# Patient Record
Sex: FEMALE | Race: WHITE
[De-identification: names, ages, dates, MRNs, and addresses within clinical notes are randomized per-mention and may not be internally consistent; named-entity substitution may affect disease eponyms.]

---

## 2017-10-26 ENCOUNTER — HOSPITAL ENCOUNTER (OUTPATIENT)
Dept: HOSPITAL 25 - OR | Age: 37
Discharge: HOME | End: 2017-10-26
Attending: OBSTETRICS & GYNECOLOGY
Payer: COMMERCIAL

## 2017-10-26 VITALS — SYSTOLIC BLOOD PRESSURE: 122 MMHG | DIASTOLIC BLOOD PRESSURE: 93 MMHG

## 2017-10-26 DIAGNOSIS — O02.1: Primary | ICD-10-CM

## 2017-10-26 DIAGNOSIS — G43.909: ICD-10-CM

## 2017-10-26 DIAGNOSIS — F41.9: ICD-10-CM

## 2017-10-26 LAB
HCT VFR BLD AUTO: 42 % (ref 35–47)
HGB BLD-MCNC: 14.3 G/DL (ref 12–16)
MCH RBC QN AUTO: 32 PG (ref 27–31)
MCHC RBC AUTO-ENTMCNC: 35 G/DL (ref 31–36)
MCV RBC AUTO: 93 FL (ref 80–97)
RBC # BLD AUTO: 4.49 10^6/UL (ref 4–5.4)
WBC # BLD AUTO: 15.6 10^3/UL (ref 3.5–10.8)

## 2017-10-26 PROCEDURE — 86900 BLOOD TYPING SEROLOGIC ABO: CPT

## 2017-10-26 PROCEDURE — 36415 COLL VENOUS BLD VENIPUNCTURE: CPT

## 2017-10-26 PROCEDURE — 86901 BLOOD TYPING SEROLOGIC RH(D): CPT

## 2017-10-26 PROCEDURE — 88305 TISSUE EXAM BY PATHOLOGIST: CPT

## 2017-10-26 PROCEDURE — 85025 COMPLETE CBC W/AUTO DIFF WBC: CPT

## 2017-10-26 PROCEDURE — 86850 RBC ANTIBODY SCREEN: CPT

## 2017-10-27 NOTE — OP
OPERATIVE REPORT:

 

DATE OF OPERATION:  10/26/17 - [facility]

 

DATE OF BIRTH:  [birth year]

 

SURGEON:  Aleksandar Thayer MD



ANESTHESIOLOGIST: Dannielle Pinto MD



ANESTHESIA: General.

 

PRE-OP DIAGNOSIS:  Missed .

 

POST-OP DIAGNOSIS:  Missed .

 

OPERATIVE PROCEDURE:  D and C.

 

COMPLICATIONS:  None.

 

FINDINGS:  On exam under anesthesia, uterus is 12-week size.  Cervix, vagina, 
and vulva appeared normal.

 

DESCRIPTION OF PROCEDURE:  Patient identified, procedure identified as D and C. 
The patient was taken to the operating room, prepped and draped in the usual 
fashion in dorsal lithotomy position under general anesthesia.  Two single-
tooth tenaculums were placed on the anterior lip of the cervix.  The cervix was 
easily dilated up to a #33 Le dilator.  The #12 suction curette was inserted 
and suction curettage performed.  The sharp curette was inserted and copious 
tissue and fluid have been taken with the vacuum.  The sharp curette was 
inserted and sharp curettage performed until the gritty sensation was felt 
throughout the circumference.  Moderate amounts of tissue obtained.  The polyp 
forceps were inserted and no further tissue was obtained using this.  The 
suction curette was inserted one more time and no further tissue was obtained.  
At the end of the procedure, a gritty sensation felt throughout the 
circumference.  All instruments were removed from the vagina.  The patient 
returned to recovery room in stable condition.  All sponge and instrument 
counts were correct.

 

 [id number]/CPS #: [id number]

MTDD

## 2018-11-21 ENCOUNTER — HOSPITAL ENCOUNTER (INPATIENT)
Dept: HOSPITAL 25 - MCHOBOUT | Age: 38
LOS: 1 days | Discharge: HOME | DRG: 560 | End: 2018-11-22
Attending: MIDWIFE | Admitting: MIDWIFE
Payer: COMMERCIAL

## 2018-11-21 DIAGNOSIS — Z3A.40: ICD-10-CM

## 2018-11-21 DIAGNOSIS — O22.43: ICD-10-CM

## 2018-11-21 DIAGNOSIS — O48.0: Primary | ICD-10-CM

## 2018-11-21 DIAGNOSIS — O43.893: ICD-10-CM

## 2018-11-21 LAB
HCT VFR BLD AUTO: 37 % (ref 35–47)
HGB BLD-MCNC: 12.6 G/DL (ref 12–16)
MCH RBC QN AUTO: 31 PG (ref 27–31)
MCHC RBC AUTO-ENTMCNC: 34 G/DL (ref 31–36)
MCV RBC AUTO: 91 FL (ref 80–97)
PLATELET # BLD AUTO: 277 10^3/UL (ref 150–450)
RBC # BLD AUTO: 4.04 10^6/UL (ref 4–5.4)
WBC # BLD AUTO: 11.7 10^3/UL (ref 3.5–10.8)

## 2018-11-21 PROCEDURE — 0HQ9XZZ REPAIR PERINEUM SKIN, EXTERNAL APPROACH: ICD-10-PCS | Performed by: MIDWIFE

## 2018-11-21 PROCEDURE — 86901 BLOOD TYPING SEROLOGIC RH(D): CPT

## 2018-11-21 PROCEDURE — 85027 COMPLETE CBC AUTOMATED: CPT

## 2018-11-21 PROCEDURE — 86850 RBC ANTIBODY SCREEN: CPT

## 2018-11-21 PROCEDURE — 10907ZC DRAINAGE OF AMNIOTIC FLUID, THERAPEUTIC FROM PRODUCTS OF CONCEPTION, VIA NATURAL OR ARTIFICIAL OPENING: ICD-10-PCS | Performed by: MIDWIFE

## 2018-11-21 PROCEDURE — 4A1HXCZ MONITORING OF PRODUCTS OF CONCEPTION, CARDIAC RATE, EXTERNAL APPROACH: ICD-10-PCS | Performed by: MIDWIFE

## 2018-11-21 PROCEDURE — 86900 BLOOD TYPING SEROLOGIC ABO: CPT

## 2018-11-21 PROCEDURE — 85025 COMPLETE CBC W/AUTO DIFF WBC: CPT

## 2018-11-21 PROCEDURE — 36415 COLL VENOUS BLD VENIPUNCTURE: CPT

## 2018-11-21 RX ADMIN — IBUPROFEN PRN MG: 600 TABLET, FILM COATED ORAL at 12:47

## 2018-11-21 RX ADMIN — DOCUSATE SODIUM SCH MG: 100 CAPSULE, LIQUID FILLED ORAL at 20:40

## 2018-11-21 RX ADMIN — DOCUSATE SODIUM SCH MG: 100 CAPSULE, LIQUID FILLED ORAL at 12:48

## 2018-11-21 RX ADMIN — DOCUSATE SODIUM SCH: 100 CAPSULE, LIQUID FILLED ORAL at 11:36

## 2018-11-21 NOTE — HP
General Information





- Reason for Visit





Contractions started earlier yesterday and were irregular. Was able to sleep 

but awoke about 0130 with more intense contractions. Now approx Q 4-6 min.





- General Information


Maternal Age: 38


Grav: 3


Para: 1


SAB: 1


IEA: 0





Estimated Due Date: 18


Determined By: LMP


Maternal Blood Type and Rh: O Positive





- Results this Pregnancy


Serology/RPR Result: Non-Reactive


Rubella Result: Immune


HBsAg Result: Negative


HIV Result: Negative


GBS Culture Result: Negative





Past Medical History


Delivery History: Hx Complicated Vaginal Delivery


Delivery History Comment: 





SVB  with PPH 700ml


Pertinent Past Medical History: See  Records


Past Medical History Comment: 





Anxiety


Migraine


Seasonal allergies


HTN


Pertinent Past Surgical History: See  Records


Past Surgical History Comment: 





[place] teeth 1998


Pertinent Family History: Non-Contributory - D


Family History Comment: 





Diabetes


Obesity


MI


Ovarian Ca


Brain aneurysm


Anxiety


Prostate Ca


Skin Ca


Alzheimer's


Down syndrome 





- Antepartal Records


Antepartal Records: Reviewed, Pregnancy Complicated by: - Possible GHTN vs 

"white coat syndrome"





Review of Systems


Constitutional: Uncomfortable


CV Complaint: No


Respiratory: Shortness of Breath: No


Gastrointestinal: No Nausea/Vomiting, Soft Stool


Genitourinary: No Dysuria, No Bleeding, No Leaking Fluid


Musculoskeletal: Contractions


Neurological: No Headache, No Visual Changes


Fetal Movement: Normal





Exam


Allergies/Adverse Reactions: 


Allergies





No Known Allergies Allergy (Verified 18 03:05)


 











/91


T 98.6


HR 80


RR 18


Lab Values - Entire Visit: 


 Laboratory Tests











  18





  02:57 02:57


 


WBC   11.7 H


 


RBC   4.04


 


Hgb   12.6


 


Hct   37


 


MCV   91


 


MCH   31


 


MCHC   34


 


RDW   14


 


Plt Count   277


 


MPV   8.5


 


Blood Type  O Positive 














- Measurements


Height: 5 ft 1 in


Weight: 145 lb


Weight in lbs: 145.400833


Body Mass Index (BMI): 27.3


Pre-Pregnancy Weight: 114 lb


Weight Gained This Pregnancy: 31 lbs and 0 ozs





- Exam


Breast: Breast Exam Deferred


CVA: No CVA Tenderness


Extremities: No Edema


Heart: Normal Rhythm/Heart Sounds


HEENT: No Significant Findings


Lungs: Clear Bilaterally


Rectal: Rectal Exam Deferred


Reflexes: DTR 2+, - - no clonus


Thyroid: - - exam normal @ entry to [facility]





- Abdominal Exam


Abdomen Exam: Non-Tender, Fundal Height Consistent with Dates





- Ultrasound/Biophysical Profile


Ultrasound Status: Not Done





Targeted Exam Findings


See L&D Outpatient Visit Provider Note for Findings: N/A


Estimated Fetal Weight: 8lb


Cervical Exam: 6cm


Effacement: 90%


Station: 0


Presenting Part: Vertex


Membrane Status: Bulging


Bleeding/Discharge: None





EFM Findings





- External Fetal Monitor Findings


Baseline Fetal Heart Rate: 125


External Fetal Monitor Findings: Accelerations Present, No Pattern of Variable 

or Late Decelerations, Variability Moderate


Contractions: Regular, Strong, 45-90 Seconds


Contraction Frequency: Q 3-4 min





Assessment/Plan





- Assessment





IUP @ 40+1 weeks gestation in active labor. IBOW. No evidence fetal acidemia





- Plan


Plan: Admit - Anticipate Vaginal Delivery


Plan Comment: 





Admit to L&D. Initiate IV due to hx of PPH. Patient desires unmedicated birth, 

would like to try tub. Anticipate SVB.





- Date/Time of Admission


Date of Admission: 18


Time of Admission: 03:02

## 2018-11-21 NOTE — PROCNOTE
[facility] OB: Delivery Note





- Nursery


Level of Nursery: Regular/Bedside





- Perineum


Perineal Injury: 1st Degree


Perineal Repair: By Delivering Practioner





- Events


Delivery Events of Note: None Apply





- Additional Delivery Notes


Additional Delivery Notes: 





Patient admitted in active labor at 40+1 with progression to complete. Patient 

coached to push in various positions, pushed to delivery of head on hands and 

knees. LOL 4'12", pushed 33 min. Baby born OA to MEDINA @ 0633, shoulders followed 

with maternal efforts. Loose nuchal unwrapped after delivery. Baby with 

spontaneous respirations, HR >110. Mother turned over and baby to abdomen. 

Apgars 8,9. Cord doubly clamped and cut by CNM once pulsations ceased. Placenta 

delivered with gentle cord traction @ 0642. Noted to have 3VC, membrane appears 

complete, small calcifications. Fundus firm to massage, scant bleeding noted. 

Baby skin-to-skin, to initiate breastfeeding. Name JEFF.

## 2018-11-22 VITALS — DIASTOLIC BLOOD PRESSURE: 88 MMHG | SYSTOLIC BLOOD PRESSURE: 128 MMHG

## 2018-11-22 LAB
BASOPHILS # BLD AUTO: 0 10^3/UL (ref 0–0.2)
EOSINOPHIL # BLD AUTO: 0.1 10^3/UL (ref 0–0.6)
HCT VFR BLD AUTO: 35 % (ref 35–47)
HGB BLD-MCNC: 11.8 G/DL (ref 12–16)
LYMPHOCYTES # BLD AUTO: 2.6 10^3/UL (ref 1–4.8)
MCH RBC QN AUTO: 31 PG (ref 27–31)
MCHC RBC AUTO-ENTMCNC: 34 G/DL (ref 31–36)
MCV RBC AUTO: 91 FL (ref 80–97)
MONOCYTES # BLD AUTO: 0.9 10^3/UL (ref 0–0.8)
NEUTROPHILS # BLD AUTO: 10 10^3/UL (ref 1.5–7.7)
NRBC # BLD AUTO: 0 10^3/UL
NRBC BLD QL AUTO: 0
PLATELET # BLD AUTO: 214 10^3/UL (ref 150–450)
RBC # BLD AUTO: 3.83 10^6/UL (ref 4–5.4)
WBC # BLD AUTO: 13.7 10^3/UL (ref 3.5–10.8)

## 2018-11-22 RX ADMIN — IBUPROFEN PRN MG: 600 TABLET, FILM COATED ORAL at 15:47

## 2018-11-22 RX ADMIN — DOCUSATE SODIUM SCH MG: 100 CAPSULE, LIQUID FILLED ORAL at 10:58

## 2018-11-22 RX ADMIN — DOCUSATE SODIUM SCH MG: 100 CAPSULE, LIQUID FILLED ORAL at 15:47

## 2018-11-22 RX ADMIN — IBUPROFEN PRN MG: 600 TABLET, FILM COATED ORAL at 04:28
